# Patient Record
Sex: MALE | Race: OTHER | HISPANIC OR LATINO | URBAN - METROPOLITAN AREA
[De-identification: names, ages, dates, MRNs, and addresses within clinical notes are randomized per-mention and may not be internally consistent; named-entity substitution may affect disease eponyms.]

---

## 2021-04-17 ENCOUNTER — EMERGENCY (EMERGENCY)
Facility: HOSPITAL | Age: 41
LOS: 0 days | Discharge: HOME | End: 2021-04-17
Attending: EMERGENCY MEDICINE
Payer: SELF-PAY

## 2021-04-17 VITALS
DIASTOLIC BLOOD PRESSURE: 83 MMHG | SYSTOLIC BLOOD PRESSURE: 147 MMHG | HEIGHT: 72 IN | WEIGHT: 309.97 LBS | RESPIRATION RATE: 20 BRPM | OXYGEN SATURATION: 97 % | TEMPERATURE: 97 F | HEART RATE: 85 BPM

## 2021-04-17 VITALS
DIASTOLIC BLOOD PRESSURE: 88 MMHG | RESPIRATION RATE: 17 BRPM | SYSTOLIC BLOOD PRESSURE: 126 MMHG | TEMPERATURE: 97 F | HEART RATE: 77 BPM | OXYGEN SATURATION: 98 %

## 2021-04-17 DIAGNOSIS — R14.0 ABDOMINAL DISTENSION (GASEOUS): ICD-10-CM

## 2021-04-17 DIAGNOSIS — R10.9 UNSPECIFIED ABDOMINAL PAIN: ICD-10-CM

## 2021-04-17 LAB
ALBUMIN SERPL ELPH-MCNC: 3.7 G/DL — SIGNIFICANT CHANGE UP (ref 3.3–5)
ALP SERPL-CCNC: 89 U/L — SIGNIFICANT CHANGE UP (ref 40–120)
ALT FLD-CCNC: 52 U/L — SIGNIFICANT CHANGE UP (ref 12–78)
ANION GAP SERPL CALC-SCNC: 5 MMOL/L — SIGNIFICANT CHANGE UP (ref 5–17)
AST SERPL-CCNC: 27 U/L — SIGNIFICANT CHANGE UP (ref 15–37)
BASOPHILS # BLD AUTO: 0.02 K/UL — SIGNIFICANT CHANGE UP (ref 0–0.2)
BASOPHILS NFR BLD AUTO: 0.3 % — SIGNIFICANT CHANGE UP (ref 0–2)
BILIRUB SERPL-MCNC: 0.8 MG/DL — SIGNIFICANT CHANGE UP (ref 0.2–1.2)
BUN SERPL-MCNC: 13 MG/DL — SIGNIFICANT CHANGE UP (ref 7–23)
CALCIUM SERPL-MCNC: 8.8 MG/DL — SIGNIFICANT CHANGE UP (ref 8.5–10.1)
CHLORIDE SERPL-SCNC: 104 MMOL/L — SIGNIFICANT CHANGE UP (ref 96–108)
CO2 SERPL-SCNC: 28 MMOL/L — SIGNIFICANT CHANGE UP (ref 22–31)
CREAT SERPL-MCNC: 1.27 MG/DL — SIGNIFICANT CHANGE UP (ref 0.5–1.3)
EOSINOPHIL # BLD AUTO: 0.08 K/UL — SIGNIFICANT CHANGE UP (ref 0–0.5)
EOSINOPHIL NFR BLD AUTO: 1.2 % — SIGNIFICANT CHANGE UP (ref 0–6)
GLUCOSE SERPL-MCNC: 98 MG/DL — SIGNIFICANT CHANGE UP (ref 70–99)
HCT VFR BLD CALC: 47.4 % — SIGNIFICANT CHANGE UP (ref 39–50)
HGB BLD-MCNC: 15.6 G/DL — SIGNIFICANT CHANGE UP (ref 13–17)
IMM GRANULOCYTES NFR BLD AUTO: 0.3 % — SIGNIFICANT CHANGE UP (ref 0–1.5)
LIDOCAIN IGE QN: 112 U/L — SIGNIFICANT CHANGE UP (ref 73–393)
LYMPHOCYTES # BLD AUTO: 2.61 K/UL — SIGNIFICANT CHANGE UP (ref 1–3.3)
LYMPHOCYTES # BLD AUTO: 40.2 % — SIGNIFICANT CHANGE UP (ref 13–44)
MAGNESIUM SERPL-MCNC: 2.3 MG/DL — SIGNIFICANT CHANGE UP (ref 1.6–2.6)
MCHC RBC-ENTMCNC: 31.3 PG — SIGNIFICANT CHANGE UP (ref 27–34)
MCHC RBC-ENTMCNC: 32.9 GM/DL — SIGNIFICANT CHANGE UP (ref 32–36)
MCV RBC AUTO: 95 FL — SIGNIFICANT CHANGE UP (ref 80–100)
MONOCYTES # BLD AUTO: 0.6 K/UL — SIGNIFICANT CHANGE UP (ref 0–0.9)
MONOCYTES NFR BLD AUTO: 9.2 % — SIGNIFICANT CHANGE UP (ref 2–14)
NEUTROPHILS # BLD AUTO: 3.17 K/UL — SIGNIFICANT CHANGE UP (ref 1.8–7.4)
NEUTROPHILS NFR BLD AUTO: 48.8 % — SIGNIFICANT CHANGE UP (ref 43–77)
PLATELET # BLD AUTO: 170 K/UL — SIGNIFICANT CHANGE UP (ref 150–400)
POTASSIUM SERPL-MCNC: 3.6 MMOL/L — SIGNIFICANT CHANGE UP (ref 3.5–5.3)
POTASSIUM SERPL-SCNC: 3.6 MMOL/L — SIGNIFICANT CHANGE UP (ref 3.5–5.3)
PROT SERPL-MCNC: 7.9 GM/DL — SIGNIFICANT CHANGE UP (ref 6–8.3)
RBC # BLD: 4.99 M/UL — SIGNIFICANT CHANGE UP (ref 4.2–5.8)
RBC # FLD: 13.5 % — SIGNIFICANT CHANGE UP (ref 10.3–14.5)
SODIUM SERPL-SCNC: 137 MMOL/L — SIGNIFICANT CHANGE UP (ref 135–145)
TROPONIN I SERPL-MCNC: <0.015 NG/ML — SIGNIFICANT CHANGE UP (ref 0.01–0.04)
WBC # BLD: 6.5 K/UL — SIGNIFICANT CHANGE UP (ref 3.8–10.5)
WBC # FLD AUTO: 6.5 K/UL — SIGNIFICANT CHANGE UP (ref 3.8–10.5)

## 2021-04-17 PROCEDURE — 85025 COMPLETE CBC W/AUTO DIFF WBC: CPT

## 2021-04-17 PROCEDURE — 80053 COMPREHEN METABOLIC PANEL: CPT

## 2021-04-17 PROCEDURE — 71045 X-RAY EXAM CHEST 1 VIEW: CPT | Mod: 26

## 2021-04-17 PROCEDURE — 36415 COLL VENOUS BLD VENIPUNCTURE: CPT

## 2021-04-17 PROCEDURE — 99283 EMERGENCY DEPT VISIT LOW MDM: CPT | Mod: 25

## 2021-04-17 PROCEDURE — 93010 ELECTROCARDIOGRAM REPORT: CPT

## 2021-04-17 PROCEDURE — 99284 EMERGENCY DEPT VISIT MOD MDM: CPT

## 2021-04-17 PROCEDURE — 93005 ELECTROCARDIOGRAM TRACING: CPT

## 2021-04-17 PROCEDURE — 83735 ASSAY OF MAGNESIUM: CPT

## 2021-04-17 PROCEDURE — 71045 X-RAY EXAM CHEST 1 VIEW: CPT

## 2021-04-17 PROCEDURE — 84484 ASSAY OF TROPONIN QUANT: CPT

## 2021-04-17 PROCEDURE — 83690 ASSAY OF LIPASE: CPT

## 2021-04-17 RX ORDER — FAMOTIDINE 10 MG/ML
20 INJECTION INTRAVENOUS ONCE
Refills: 0 | Status: COMPLETED | OUTPATIENT
Start: 2021-04-17 | End: 2021-04-17

## 2021-04-17 RX ORDER — SUCRALFATE 1 G
1 TABLET ORAL ONCE
Refills: 0 | Status: COMPLETED | OUTPATIENT
Start: 2021-04-17 | End: 2021-04-17

## 2021-04-17 RX ORDER — SODIUM CHLORIDE 9 MG/ML
1000 INJECTION INTRAMUSCULAR; INTRAVENOUS; SUBCUTANEOUS ONCE
Refills: 0 | Status: COMPLETED | OUTPATIENT
Start: 2021-04-17 | End: 2021-04-17

## 2021-04-17 RX ORDER — OMEPRAZOLE 10 MG/1
1 CAPSULE, DELAYED RELEASE ORAL
Qty: 14 | Refills: 0
Start: 2021-04-17 | End: 2021-04-30

## 2021-04-17 RX ADMIN — FAMOTIDINE 20 MILLIGRAM(S): 10 INJECTION INTRAVENOUS at 09:03

## 2021-04-17 RX ADMIN — Medication 30 MILLILITER(S): at 10:05

## 2021-04-17 RX ADMIN — Medication 1 GRAM(S): at 10:05

## 2021-04-17 RX ADMIN — SODIUM CHLORIDE 1000 MILLILITER(S): 9 INJECTION INTRAMUSCULAR; INTRAVENOUS; SUBCUTANEOUS at 09:03

## 2021-04-17 NOTE — ED ADULT TRIAGE NOTE - DOMESTIC TRAVEL HIGH RISK QUESTION
Date of Service: 01/22/2017    INFECTIOUS DISEASES CONSULTATION     REFERRING PHYSICIAN:  Trell Fields MD    REASON FOR CONSULTATION:  Evaluation of patient with possible cholecystitis for appropriate antimicrobial therapy.    CHIEF COMPLAINT:  Abdominal pain, nausea, vomiting.    HISTORY OF PRESENT ILLNESS:  The patient is a 52-year-old white male with past medical history of IV Heroin and Cocaine abuse who comes into the emergency room with complaints of upper abdominal pain and chest pain.  Symptoms began 2 hours prior to admission, he was febrile in the emergency room, his abdomen was tender.  Labs showed leukopenia, neutropenia.  His absolute neutrophil count was 900.  Lipase level was normal.  Drug screen was positive for Cocaine, opiates.  Urinalysis was unremarkable.  Lactic acid level was high at 2.3.  The patient underwent a CT of the abdomen which shows mild amount of pericholecystic fluid.  Surgery has been consulted and patient is n.p.o.  A HIDA scan has been ordered.    PAST MEDICAL HISTORY:  1.  Hypertension.  2.  Diabetes mellitus.  3.  Polysubstance abuse including IV drugs.  4.  History of chronic hepatitis-C.    PAST SURGICAL HISTORY:  Significant for C-spine, biceps tendon repair surgery.    FAMILY HISTORY:  Significant for hypertension and heart disease.    SOCIAL HISTORY:  Significant for IV drug use and Cocaine abuse.    ALLERGIES:  Morphine.    CURRENT INPATIENT MEDICATIONS:  1.  Piperacillin- Tazobactam.  2.  Tylenol.  3.  Benadryl.  4.  Haldol.  5.  One dose of Vancomycin given in the emergency room.    REVIEW OF SYSTEMS:  Positive for nausea, vomiting, abdominal pain.  He denies any diarrhea, denies any respiratory complaints.  Denies any urinary complaints.  A comprehensive 14-point review of systems was obtained and is negative except for pertinent positives mentioned in HPI.    PHYSICAL EXAMINATION:  VITAL SIGNS:  Blood pressure 124/98, pulse rate 91, respiratory rate 16, temperature  101, T-max 102.7.  GENERAL:  He is awake, alert and oriented x3.  He has poor hygiene.    HEENT:  Normocephalic, atraumatic.  Pupils are equal.  Poor dental hygiene.  PULMONARY:  Lungs are bilaterally clear.  CARDIOVASCULAR:  Pulses regular.  S1, S2 normal, no murmurs appreciated.  GASTROINTESTINAL:  Abdomen:  He is tender in the upper quadrant, most specifically in the right upper quadrant and epigastric area, but no rebound tenderness.  No peritoneal signs.  Bowel sounds normoactive.  No organomegaly.  EXTREMITIES:  Without any cyanosis, edema or clubbing.  SKIN:  Significant for multiple needle marks on his arms.  His hygiene is poor.    LABORATORY DATA:  Creatinine 1.05, white count 1.4, absolute neutrophil count is 900.  Influenza screen is negative.  Blood cultures are pending.  Urinalysis is unremarkable.  Drug screen is positive.  Alcohol screen was negative.  Lactic acid level was 2.3.    IMAGING:  Ultrasound of the gallbladder was reviewed, which shows gallbladder wall thickening and pericholecystic fluid.  Imaging, ultrasound CT abdomen reviewed.  HIDA scan is pending.    IMPRESSION:  1.  Right upper quadrant abdominal pain secondary to acute cholecystitis.  2.  Intravenous heroin abuse.  3.  Rule out bacteremia.  4.  Leukopenia and neutropenia.  5.  Chronic hepatitis-C.  6.  Methicillin-resistant Staph aureus colonization.    PLAN AND RECOMMENDATIONS:  1.  Agree with Piperacillin-Tazobactam for broad coverage of cholecystitis.  Surgery team is evaluating the patient.  2.  I have added IV Vancomycin until the culture results are available as the patient is IV drug abuser and he has a history of Methicillin-resistant Staphylococcus aureus colonization.  3.  Monitor absolute neutrophil count.  Patient might benefit from 1 dose of Neupogen if his counts do not improve.  4.  Discussed with Dr. Noland.      Dictated By: Rachel Altamirano MD  Signing Provider: Rachel Altamirano MD    [facility] ([id number])  DD:  01/22/2017 11:10:21 TD: 01/22/2017 11:57:56    Copy Sent To:      No

## 2021-04-17 NOTE — ED PROVIDER NOTE - OBJECTIVE STATEMENT
Pt is a 40 year old male with no PMH who comes to the ED complaining of abdominal bloating. States he ate chinese food last night and then felt bloated. Pt went to sleep watching TV and woke with palpitations and drenched in sweat. States used to drink 4-5 beers a day for years and one month ago stopped completely. States he felt the hang over was too much so stopped drinking. No vomiting blood no black stool or blood in stool.

## 2021-04-17 NOTE — ED ADULT NURSE NOTE - OBJECTIVE STATEMENT
Pt is a 39 y/o male, A & O x 3, VSS presents to ED with abdominal pain, pt states he also experienced chest pain and palpitations last night. Pt states he drinks alcohol heavily, last drink one month ago, pt denies any medications, has never seen a

## 2021-04-17 NOTE — ED PROVIDER NOTE - CARE PROVIDER_API CALL
Cresencio Cee)  Gastroenterology; Internal Medicine  [address], [address]  [place] [address]  Phone: [phone]  Fax: [phone]  Follow Up Time:

## 2021-04-17 NOTE — ED ADULT NURSE NOTE - NS ED NURSE LEVEL OF CONSCIOUSNESS MENTAL STATUS
Alert and oriented x 3, normal mood and affect, no apparent risk to self or others.
Awake/Alert/Cooperative

## 2021-04-17 NOTE — ED ADULT TRIAGE NOTE - CHIEF COMPLAINT QUOTE
Pt presents to er with complaints of lower abdominal pain, pt reports last night that he had bilateral upper extremity numbness that went away, pt states he does not have chest pain, sob, back pain at this time, states his symptoms went away and he now has abdominal pain, denies dysuria, change in bowel habits, constipation, pt observed sitting up in stretcher without distress, denies medical history or medication use.

## 2021-04-17 NOTE — ED PROVIDER NOTE - PATIENT PORTAL LINK FT
You can access the FollowMyHealth Patient Portal offered by [facility] by registering at the following website: [email or web address]. By joining [employer]’s FollowMyHealth portal, you will also be able to view your health information using other applications (apps) compatible with our system.

## 2021-04-17 NOTE — ED ADULT NURSE NOTE - NSIMPLEMENTINTERV_GEN_ALL_ED
Implemented All Universal Safety Interventions:  [place] to call system. Call bell, personal items and telephone within reach. Instruct patient to call for assistance. Room bathroom lighting operational. Non-slip footwear when patient is off stretcher. Physically safe environment: no spills, clutter or unnecessary equipment. Stretcher in lowest position, wheels locked, appropriate side rails in place.

## 2021-05-19 NOTE — ED ADULT NURSE NOTE - PAIN RATING/NUMBER SCALE (0-10): REST
4 Female Pregnancy Counseling Text: Female patients should also be on two forms of birth control while taking this medication and for one month after their last dose.

## 2023-04-14 ENCOUNTER — EMERGENCY (EMERGENCY)
Facility: HOSPITAL | Age: 43
LOS: 0 days | Discharge: HOME | End: 2023-04-14
Attending: STUDENT IN AN ORGANIZED HEALTH CARE EDUCATION/TRAINING PROGRAM
Payer: MEDICAID

## 2023-04-14 VITALS
TEMPERATURE: 97 F | SYSTOLIC BLOOD PRESSURE: 154 MMHG | RESPIRATION RATE: 18 BRPM | OXYGEN SATURATION: 98 % | WEIGHT: 300.05 LBS | DIASTOLIC BLOOD PRESSURE: 90 MMHG | HEART RATE: 89 BPM | HEIGHT: 72 IN

## 2023-04-14 DIAGNOSIS — R04.0 EPISTAXIS: ICD-10-CM

## 2023-04-14 PROBLEM — Z78.9 OTHER SPECIFIED HEALTH STATUS: Chronic | Status: ACTIVE | Noted: 2021-04-17

## 2023-04-14 PROCEDURE — 99283 EMERGENCY DEPT VISIT LOW MDM: CPT

## 2023-04-14 PROCEDURE — 99282 EMERGENCY DEPT VISIT SF MDM: CPT

## 2023-04-14 RX ORDER — OXYMETAZOLINE HYDROCHLORIDE 0.5 MG/ML
2 SPRAY NASAL ONCE
Refills: 0 | Status: COMPLETED | OUTPATIENT
Start: 2023-04-14 | End: 2023-04-14

## 2023-04-14 RX ADMIN — OXYMETAZOLINE HYDROCHLORIDE 2 SPRAY(S): 0.5 SPRAY NASAL at 06:51

## 2023-04-14 NOTE — ED PROVIDER NOTE - NSFOLLOWUPINSTRUCTIONS_ED_ALL_ED_FT
You can use Ayr nasal gel or equivalent store brand two times daily in both nostrils.    Nosebleed, Adult    A nosebleed is when blood comes out of the nose. Nosebleeds are common. Usually, they are not a sign of a serious condition.    Nosebleeds can happen if a blood vessel in your nose starts to bleed or if the lining of your nose (mucous membrane) cracks. They are commonly caused by:  •Allergies.  •Colds.  •Picking your nose.  •Blowing your nose too hard.  •An injury from sticking an object into your nose or getting hit in the nose.  •Dry or cold air.    Less common causes of nosebleeds include:  •Toxic fumes.  •Something abnormal in the nose or in the air-filled spaces in the bones of the face (sinuses).  •Growths in the nose, such as polyps.  •Blood thinners or conditions that cause blood to clot slowly.  •Certain illnesses or procedures that irritate or dry out the nasal passages.    Follow these instructions at home:    When you have a nosebleed:   •Sit down and tilt your head slightly forward.  •Use a clean towel or tissue to pinch your nostrils under the bony part of your nose. After 5 minutes, let go of your nose and see if bleeding starts again. Do not release pressure before that time. If there is still bleeding, repeat the pinching and holding for 5 minutes or until the bleeding stops.  • Do not place tissues or gauze in the nose to stop the bleeding.  •Avoid lying down and avoid tilting your head backward. That may make blood collect in the throat and cause gagging or coughing.  •Use a nasal spray decongestant to help with a nosebleed as told by your health care provider.    After a nosebleed:   •Avoid blowing your nose or sniffing for a number of hours.  •Avoid straining, lifting, or bending at the waist for several days. You may go back to other normal activities as you are able.  •If you are taking aspirin or blood thinners and you have nosebleeds, talk to your health care provider. These medicines make bleeding more likely.  •Ask your health care provider if you should stop taking the medicines or if you should adjust the dose.  •Do not stop taking medicines that your health care provider has recommended unless he or she tells you to stop taking them.    •If your nosebleed was caused by dry mucous membranes, use over-the-counter saline nasal spray or gel and a humidifier as told by your health care provider. This will keep the mucous membranes moist and allow them to heal. If you need to use one of these products:  •Choose one that is water-soluble.  •Use only as much as you need and use it only as often as needed.  •Do not lie down right after you use it.  •If you get nosebleeds often, talk with your health care provider about medical treatments. Options may include:    •Nasal cautery. This treatment stops and prevents nosebleeds by using a chemical swab or electrical device to lightly burn tiny blood vessels inside the nose.  •Nasal packing. A gauze or other material is placed in the nose to keep constant pressure on the bleeding area.    Contact a health care provider if you:  •Have a fever.  •Get nosebleeds often or more often than usual.  •Bruise very easily.  •Have a nosebleed from having something stuck in your nose.  •Have bleeding in your mouth.  •Vomit or cough up brown material.  •Have a nosebleed after you start a new medicine.    Get help right away if:  •You have a nosebleed after a fall or a head injury.  •Your nosebleed does not go away after 20 minutes.  •You feel dizzy or weak.  •You have unusual bleeding from other parts of your body.  •You have unusual bruising on other parts of your body.  •You become sweaty.  •You vomit blood.    Summary  •A nosebleed is when blood comes out of the nose. Common causes include allergies, an injury to the nose, or cold or dry air.  •Initial treatment includes applying pressure for 5 minutes.   •Moisturizing the nose with saline nasal spray or gel after a nosebleed may help prevent future bleeding.  •Get help right away if your nosebleed does not go away after 20 minutes.    This information is not intended to replace advice given to you by your health care provider. Make sure you discuss any questions you have with your health care provider.

## 2023-04-14 NOTE — ED PROVIDER NOTE - CLINICAL SUMMARY MEDICAL DECISION MAKING FREE TEXT BOX
Patient likely with anterior nosebleed, resolved at this time.  Right nare with small scab present.  Do not suspect posterior bleed.  Patient does not appear to be symptomatically anemic.  Will provide Afrin, recommend aloe nasal gel and discharged with primary care follow-up.

## 2023-04-14 NOTE — ED ADULT TRIAGE NOTE - CHIEF COMPLAINT QUOTE
nose bleed self resolved, pt state this happened once in past, also self resolved. no blood thinners, no trauma, , sneezing last three weeks, states possibly seasonal

## 2023-04-14 NOTE — ED PROVIDER NOTE - PATIENT PORTAL LINK FT
You can access the FollowMyHealth Patient Portal offered by [facility] by registering at the following website: [email or web address]. By joining [employer]’s FollowMyHealth portal, you will also be able to view your health information using other applications (apps) compatible with our system.

## 2023-04-14 NOTE — ED PROVIDER NOTE - OBJECTIVE STATEMENT
Patient with no significant past medical history not on anticoagulation is presenting for nosebleed.  Started about 1 hour ago and self resolved.  Has history of similar episodes in the spring.  Denies any recent nasal trauma.  No headache, chest pain or shortness of breath.  Denies lightheadedness or dizziness.  States he just wanted to get checked out for evaluation.

## 2023-04-14 NOTE — ED PROVIDER NOTE - PHYSICAL EXAMINATION
Constitutional: Awake, Alert, non-toxic. No acute distress. Well appearing, well nourished.   HEAD: Normocephalic, atraumatic.   EYES: EOM intact, conjunctiva and sclera are clear bilaterally.  ENT: External ears normal. No rhinorrhea, no tracheal deviation, right naris with dried blood and small scab, no active bleeding  NECK: Supple, non-tender  CARDIOVASCULAR: regular rate  RESPIRATORY: Normal respiratory effort; Speaking in full sentences. No accessory muscle use.   MSK:  no lower extremity edema, no deformities  SKIN: Warm, dry  NEURO: A&O x3. Sensory and motor functions are grossly intact. Speech is normal.  PSYCH: Appearance and judgement seem appropriate for gender and age.

## 2023-10-23 NOTE — ED PROVIDER NOTE - PSH
No significant past surgical history Ftsg Text: The defect edges were debeveled with a #15 scalpel blade. Given the location of the defect, shape of the defect and the proximity to free margins a full thickness skin graft was deemed most appropriate. Using a sterile surgical marker, the primary defect shape was transferred to the donor site. The area thus outlined was incised deep to adipose tissue with a #15 scalpel blade.  The harvested graft was then trimmed of adipose tissue until only dermis and epidermis was left.  The skin margins of the secondary defect were undermined to an appropriate distance in all directions utilizing iris scissors.  The secondary defect was closed with interrupted buried subcutaneous sutures.  The skin edges were then re-apposed with running  sutures.  The skin graft was then placed in the primary defect and oriented appropriately.

## 2023-10-28 NOTE — ED PROVIDER NOTE - CONSTITUTIONAL, MLM
complains of pain/discomfort Well appearing, awake, alert, oriented to person, place, time/situation and in no apparent distress. normal...